# Patient Record
Sex: MALE | Race: WHITE | NOT HISPANIC OR LATINO | Employment: PART TIME | URBAN - METROPOLITAN AREA
[De-identification: names, ages, dates, MRNs, and addresses within clinical notes are randomized per-mention and may not be internally consistent; named-entity substitution may affect disease eponyms.]

---

## 2017-07-06 ENCOUNTER — HOSPITAL ENCOUNTER (EMERGENCY)
Facility: HOSPITAL | Age: 27
Discharge: HOME | End: 2017-07-06
Attending: EMERGENCY MEDICINE | Admitting: EMERGENCY MEDICINE
Payer: COMMERCIAL

## 2017-07-06 VITALS
TEMPERATURE: 97.6 F | SYSTOLIC BLOOD PRESSURE: 154 MMHG | WEIGHT: 150 LBS | DIASTOLIC BLOOD PRESSURE: 92 MMHG | RESPIRATION RATE: 16 BRPM | HEART RATE: 79 BPM | OXYGEN SATURATION: 100 %

## 2017-07-06 DIAGNOSIS — K08.89 DENTALGIA: Primary | ICD-10-CM

## 2017-07-06 PROCEDURE — 99282 EMERGENCY DEPT VISIT SF MDM: CPT

## 2017-07-06 PROCEDURE — 96372 THER/PROPH/DIAG INJ SC/IM: CPT

## 2017-07-06 RX ORDER — IBUPROFEN 400 MG/1
400 TABLET ORAL EVERY 6 HOURS PRN
Qty: 30 TABLET | Refills: 0 | Status: SHIPPED | OUTPATIENT
Start: 2017-07-06 | End: 2018-02-06

## 2017-07-06 RX ORDER — ACETAMINOPHEN 325 MG/1
975 TABLET ORAL ONCE
Status: COMPLETED | OUTPATIENT
Start: 2017-07-06 | End: 2017-07-06

## 2017-07-06 RX ORDER — KETOROLAC TROMETHAMINE 30 MG/ML
15 INJECTION, SOLUTION INTRAMUSCULAR; INTRAVENOUS ONCE
Status: COMPLETED | OUTPATIENT
Start: 2017-07-06 | End: 2017-07-06

## 2017-07-06 RX ORDER — SENNOSIDES 8.6 MG
650 CAPSULE ORAL EVERY 8 HOURS PRN
Qty: 30 TABLET | Refills: 0 | Status: SHIPPED | OUTPATIENT
Start: 2017-07-06 | End: 2018-02-06

## 2017-07-06 RX ADMIN — KETOROLAC TROMETHAMINE 15 MG: 30 INJECTION, SOLUTION INTRAMUSCULAR at 06:43

## 2017-07-06 RX ADMIN — ACETAMINOPHEN 975 MG: 325 TABLET, FILM COATED ORAL at 06:43

## 2018-02-06 ENCOUNTER — OFFICE VISIT (OUTPATIENT)
Dept: INTERNAL MEDICINE CLINIC | Facility: CLINIC | Age: 28
End: 2018-02-06
Payer: COMMERCIAL

## 2018-02-06 VITALS
SYSTOLIC BLOOD PRESSURE: 144 MMHG | HEIGHT: 67 IN | TEMPERATURE: 98.4 F | BODY MASS INDEX: 24.91 KG/M2 | DIASTOLIC BLOOD PRESSURE: 88 MMHG | WEIGHT: 158.73 LBS | HEART RATE: 78 BPM

## 2018-02-06 DIAGNOSIS — Z23 NEED FOR IMMUNIZATION AGAINST INFLUENZA: ICD-10-CM

## 2018-02-06 DIAGNOSIS — R03.0 ELEVATED BLOOD PRESSURE READING IN OFFICE WITHOUT DIAGNOSIS OF HYPERTENSION: ICD-10-CM

## 2018-02-06 DIAGNOSIS — A63.0 GENITAL WARTS: Primary | ICD-10-CM

## 2018-02-06 PROCEDURE — 99203 OFFICE O/P NEW LOW 30 MIN: CPT | Performed by: NURSE PRACTITIONER

## 2018-02-06 NOTE — PROGRESS NOTES
I have reviewed the notes, assessments, and/or procedures performed by advanced practitioner, I agree with their documentation of Xuan Galeano

## 2018-02-06 NOTE — PATIENT INSTRUCTIONS
Genital Warts   WHAT YOU NEED TO KNOW:   Genital warts are a sexually transmitted infection (STI) caused by the human papillomavirus (HPV)  Genital warts are growths that appear in or on the penis, vagina, or anus  Genital warts are spread during genital, anal, or oral sex  A woman can also pass them to a baby when she gives birth  DISCHARGE INSTRUCTIONS:   Contact your healthcare provider if:   · Your genital warts return  · The skin that is being treated for genital warts is very painful or swollen  · You see or feel new warts on another part of your body  · You have questions or concerns about your condition or care  Medicines:   · Immunomodulators  help strengthen your immune system and treat genital warts  · Antiproliferatives  help stop genital warts from growing in size or increasing in number  · Antivirals  help control and stop virus growth, such as HPV  · Take your medicine as directed  Contact your healthcare provider if you think your medicine is not helping or if you have side effects  Tell him or her if you are allergic to any medicine  Keep a list of the medicines, vitamins, and herbs you take  Include the amounts, and when and why you take them  Bring the list or the pill bottles to follow-up visits  Carry your medicine list with you in case of an emergency  Self-care:   · Do not touch or scratch the warts  This can cause the infection to spread to other parts of your body  · Do not have sex while you are being treated for genital warts  Medicine used on your skin weakens condoms and diaphragms  You also risk spreading genital warts to your partner  · Get regular Pap smears  If you are a woman, this can help diagnose HPV and prevent the spread of the virus  Prevent genital warts:   · Tell your sexual partners that you are being treated for genital warts  They may also be infected and need treatment  · Get the HPV vaccine    The HPV vaccine is given at 9 to 26 years of age to help prevent cervical cancer and genital warts  Ask your healthcare provider for more information about this vaccine  Follow up with your healthcare provider as directed:  Write down your questions so you remember to ask them during your visits  © 2017 [facility] Information is for End User's use only and may not be sold, redistributed or otherwise used for commercial purposes  All illustrations and images included in CareNotes® are the copyrighted property of [employer] M [employer]  or [address]  The above information is an  only  It is not intended as medical advice for individual conditions or treatments  Talk to your doctor, nurse or pharmacist before following any medical regimen to see if it is safe and effective for you

## 2018-02-20 ENCOUNTER — OFFICE VISIT (OUTPATIENT)
Dept: INTERNAL MEDICINE CLINIC | Facility: CLINIC | Age: 28
End: 2018-02-20
Payer: COMMERCIAL

## 2018-02-20 ENCOUNTER — APPOINTMENT (OUTPATIENT)
Dept: LAB | Facility: CLINIC | Age: 28
End: 2018-02-20
Payer: COMMERCIAL

## 2018-02-20 VITALS
SYSTOLIC BLOOD PRESSURE: 128 MMHG | WEIGHT: 160.94 LBS | HEART RATE: 72 BPM | TEMPERATURE: 97.7 F | DIASTOLIC BLOOD PRESSURE: 66 MMHG | HEIGHT: 67 IN | BODY MASS INDEX: 25.26 KG/M2

## 2018-02-20 DIAGNOSIS — R03.0 ELEVATED BLOOD PRESSURE READING IN OFFICE WITHOUT DIAGNOSIS OF HYPERTENSION: ICD-10-CM

## 2018-02-20 DIAGNOSIS — A63.0 GENITAL WARTS: Primary | ICD-10-CM

## 2018-02-20 LAB
ALBUMIN SERPL BCP-MCNC: 4.2 G/DL (ref 3.5–5)
ALP SERPL-CCNC: 68 U/L (ref 46–116)
ALT SERPL W P-5'-P-CCNC: 61 U/L (ref 12–78)
ANION GAP SERPL CALCULATED.3IONS-SCNC: 5 MMOL/L (ref 4–13)
AST SERPL W P-5'-P-CCNC: 24 U/L (ref 5–45)
BILIRUB SERPL-MCNC: 1.25 MG/DL (ref 0.2–1)
BUN SERPL-MCNC: 8 MG/DL (ref 5–25)
CALCIUM SERPL-MCNC: 8.8 MG/DL (ref 8.3–10.1)
CHLORIDE SERPL-SCNC: 101 MMOL/L (ref 100–108)
CHOLEST SERPL-MCNC: 176 MG/DL (ref 50–200)
CO2 SERPL-SCNC: 31 MMOL/L (ref 21–32)
CREAT SERPL-MCNC: 0.82 MG/DL (ref 0.6–1.3)
GFR SERPL CREATININE-BSD FRML MDRD: 120 ML/MIN/1.73SQ M
GLUCOSE P FAST SERPL-MCNC: 77 MG/DL (ref 65–99)
HDLC SERPL-MCNC: 51 MG/DL (ref 40–60)
LDLC SERPL CALC-MCNC: 95 MG/DL (ref 0–100)
POTASSIUM SERPL-SCNC: 4.3 MMOL/L (ref 3.5–5.3)
PROT SERPL-MCNC: 7.3 G/DL (ref 6.4–8.2)
SODIUM SERPL-SCNC: 137 MMOL/L (ref 136–145)
TRIGL SERPL-MCNC: 151 MG/DL
TSH SERPL DL<=0.05 MIU/L-ACNC: 2.11 UIU/ML (ref 0.36–3.74)

## 2018-02-20 PROCEDURE — 80053 COMPREHEN METABOLIC PANEL: CPT

## 2018-02-20 PROCEDURE — 3008F BODY MASS INDEX DOCD: CPT | Performed by: NURSE PRACTITIONER

## 2018-02-20 PROCEDURE — 99213 OFFICE O/P EST LOW 20 MIN: CPT | Performed by: NURSE PRACTITIONER

## 2018-02-20 PROCEDURE — 36415 COLL VENOUS BLD VENIPUNCTURE: CPT

## 2018-02-20 PROCEDURE — 84443 ASSAY THYROID STIM HORMONE: CPT

## 2018-02-20 PROCEDURE — 80061 LIPID PANEL: CPT

## 2018-02-20 PROCEDURE — 3725F SCREEN DEPRESSION PERFORMED: CPT | Performed by: NURSE PRACTITIONER

## 2018-02-20 NOTE — PROGRESS NOTES
Assessment/Plan:    Genital warts   - Keep apt with oncology team on March 6th 2018   - Get blood tests done   - Report any new s/s ASAP   - F/u with me prn; will monitor BP as needed    Subjective: Presents to the clinic for follow     Patient ID: Amos Flores is a 28 y o  male  HPI  Last seen at this clinic by me under two weeks ago for genital warts and elevated BP  He was referred to urology clinic for warts and basic labs were ordered  His urology apt is on March 6th 2018  Here today for recheck of BP and lab results  Review of his record show he did not get his blood tests done  I have reprinted his lab orders and handed it to him  I also personally walked with him to the front to show him where the lab is and to remind him to get it done  His BP is WNL, 128/66 by MA and 120/60 mmHg by me, sitting, Left arm  Denies C P , SOB, bleeding or drainage from the warts, penile pain, or urinary symptoms  The following portions of the patient's history were reviewed and updated as appropriate: allergies, current medications, past family history, past medical history, past social history, past surgical history and problem list     Review of Systems   Constitutional: Negative for appetite change, chills, fatigue and unexpected weight change  Respiratory: Negative for cough, chest tightness, shortness of breath and wheezing  Cardiovascular: Negative for chest pain and palpitations  Genitourinary: Negative for decreased urine volume, discharge, dysuria, frequency, genital sores, hematuria, penile pain, penile swelling, scrotal swelling and testicular pain  Skin: Negative for color change, pallor, rash and wound  Objective:    /66 (BP Location: Right arm, Patient Position: Sitting, Cuff Size: Adult)   Pulse 72   Temp 97 7 °F (36 5 °C) (Oral)   Ht 5' 6 5" (1 689 m)   Wt 73 kg (160 lb 15 oz)   BMI 25 59 kg/m²        Physical Exam   Constitutional: He is oriented to person, place, and time  He appears well-developed and well-nourished  No distress  HENT:   Head: Normocephalic and atraumatic  Right Ear: External ear normal    Left Ear: External ear normal    Cardiovascular: Normal rate, regular rhythm and normal heart sounds  No murmur heard  Pulmonary/Chest: Effort normal and breath sounds normal  No respiratory distress  He has no wheezes  He exhibits no tenderness  Genitourinary:   Genitourinary Comments: Examination deferred  Was done last year   Neurological: He is alert and oriented to person, place, and time  Skin: He is not diaphoretic  Psychiatric: He has a normal mood and affect   His behavior is normal  Judgment and thought content normal

## 2018-02-20 NOTE — PATIENT INSTRUCTIONS
Common Wart   WHAT YOU NEED TO KNOW:   A common wart is a thick, rough, skin growth caused by human papillomavirus virus (HPV)  HPV is a germ that spreads by skin-to-skin contact or contact with contaminated surfaces  Common warts are benign (not cancer)  DISCHARGE INSTRUCTIONS:   Contact your healthcare provider or dermatologist if:   · Your wart returns or does not go away after treatment  · Your wart grows larger, or begins to spread or cluster  · You have a wart on your face, genitals, or rectum  · Your wart bleeds, becomes painful, or drains pus  · You have questions about your condition or care  Medicines:   · Salicylic acid  helps dry and remove the wart  It is available without a prescription  Ask your healthcare provider where you can purchase it  Before you apply salicylic acid, soak the wart in warm water for 20 minutes  Keep your wart damp  Apply a small amount of salicylic acid directly to your wart  Do not apply salicylic acid to healthy skin  Cover the wart as directed  It is best to do this at bedtime  When you wake, use a pumice stone (a rough stone) or nail file to gently remove dead skin  Repeat as directed  · Take your medicine as directed  Contact your healthcare provider if you think your medicine is not helping or if you have side effects  Tell him or her if you are allergic to any medicine  Keep a list of the medicines, vitamins, and herbs you take  Include the amounts, and when and why you take them  Bring the list or the pill bottles to follow-up visits  Carry your medicine list with you in case of an emergency  Apply duct tape to your wart as directed: Your healthcare provider may tell you to apply duct tape to your wart  Duct tape helps dry and remove the wart  You may be directed to leave the duct tape on for 6 days  On day 7, take the tape off and soak the wart in warm water for 5 minutes  Gently scrape the wart with a pumice stone or nail file   Then apply a new piece of duct tape and follow the same steps until the wart is gone  Follow up with your healthcare provider as directed:  Write down your questions so you remember to ask them during your visits  © 2017 [address] Information is for End User's use only and may not be sold, redistributed or otherwise used for commercial purposes  All illustrations and images included in CareNotes® are the copyrighted property of [employer]  or Zackary Castaneda  The above information is an  only  It is not intended as medical advice for individual conditions or treatments  Talk to your doctor, nurse or pharmacist before following any medical regimen to see if it is safe and effective for you

## 2018-10-02 ENCOUNTER — OFFICE VISIT (OUTPATIENT)
Dept: MULTI SPECIALTY CLINIC | Facility: CLINIC | Age: 28
End: 2018-10-02
Payer: COMMERCIAL

## 2018-10-02 VITALS
SYSTOLIC BLOOD PRESSURE: 118 MMHG | DIASTOLIC BLOOD PRESSURE: 80 MMHG | WEIGHT: 167.99 LBS | TEMPERATURE: 98.1 F | HEART RATE: 60 BPM | BODY MASS INDEX: 26.37 KG/M2 | HEIGHT: 67 IN

## 2018-10-02 DIAGNOSIS — A63.0 GENITAL WARTS: Primary | ICD-10-CM

## 2018-10-02 PROCEDURE — 99214 OFFICE O/P EST MOD 30 MIN: CPT | Performed by: UROLOGY

## 2018-10-02 NOTE — PATIENT INSTRUCTIONS
Genital Warts   WHAT YOU NEED TO KNOW:   What are genital warts? Genital warts are a sexually transmitted infection (STI) caused by the human papillomavirus (HPV)  Genital warts are growths that appear in or on the penis, vagina, or anus  Genital warts are spread during genital, anal, or oral sex  A woman can also pass them to a baby when she gives birth  What increases my risk for genital warts? · Not wearing protection during sex, such as a condom    · Multiple sex partners    · A weak immune system caused by other STIs, HIV, or cancer    · Smoking  What are the signs and symptoms of genital warts? Genital warts are flat or dome shaped and can be pink, red, or brown  As the warts grow, your skin may itch or burn  Warts can be painful if they grow together  Over time the warts may look like cauliflower  They may feel moist and rough when you touch them  How are genital warts diagnosed? Your healthcare provider will examine you and ask about your sexual activity  He will use a light to look at your penis, vagina, or anus  You may need any of the following tests:  · An acetic acid test  is when your healthcare provider puts a solution on the affected area  The solution turns the warts white  · A sample of one or more warts  may be removed and sent for tests  The tests may show if you have a higher risk of certain types of cancer, such as cervical cancer  · A Pap smear  may show if you have HPV or other cervical problems  A sample of cells from your cervix are collected and sent for testing  How are genital warts treated? Small genital warts may heal without treatment  In some cases, the warts can get bigger, or you may get more of them  Treatment can help prevent you from spreading warts to others and may help prevent cervical cancer in women  Treatment can also take away your symptoms and help you feel better   You may need any of the following:  · Medicines:      ¨ Immunomodulators  help strengthen your immune system and treat genital warts  ¨ Antiproliferatives  help stop genital warts from growing in size or increasing in number  ¨ Antivirals  help control and stop virus growth, such as HPV  · Procedures:      ¨ Cryotherapy  is used to freeze and destroy genital warts with liquid nitrogen  ¨ Electrocautery  is used to destroy genital warts with heat  ¨ A laser  may be used to remove larger or thicker genital warts  The laser uses heat to destroy the tissues around or near your warts  This treatment may help the areas heal without leaving scars  ¨ Surgery  with a scalpel, scissors, or other surgical tools may be used to remove the warts  How can I manage my symptoms? · Do not touch or scratch the warts  This can cause the infection to spread to other parts of your body  · Do not have sex while you are being treated for genital warts  Medicine used on your skin weakens condoms and diaphragms  You also risk spreading genital warts to your partner  · Get regular Pap smears  If you are a woman, this can help diagnose HPV and prevent the spread of the virus  How can I help prevent the spread of genital warts? · Tell your sexual partners that you are being treated for genital warts  They may also be infected and need treatment  · Get the HPV vaccine  The HPV vaccine is given at 5to 32years of age to help prevent cervical cancer and genital warts  Ask your healthcare provider for more information about this vaccine  When should I contact my healthcare provider? · Your genital warts return  · The skin that is being treated for genital warts is very painful or swollen  · You see or feel new warts on another part of your body  · You have questions or concerns about your condition or care  CARE AGREEMENT:   You have the right to help plan your care  Learn about your health condition and how it may be treated   Discuss treatment options with your caregivers to decide what care you want to receive  You always have the right to refuse treatment  The above information is an  only  It is not intended as medical advice for individual conditions or treatments  Talk to your doctor, nurse or pharmacist before following any medical regimen to see if it is safe and effective for you  © 2017 [address] Information is for End User's use only and may not be sold, redistributed or otherwise used for commercial purposes  All illustrations and images included in CareNotes® are the copyrighted property of [employer]  or Zack[employer] Castaneda

## 2018-10-02 NOTE — PROGRESS NOTES
Assessment/Plan:    Genital warts  CO2 laser excision condyloma  Consent obtained by Dr Neeru Sullivan       Diagnoses and all orders for this visit:    Genital warts          Subjective:      Patient ID: Raffi Geronimo is a 33 y o  male  32 y/o male presents with genital warts x 2 years  Patient started with anal area condyloma which has gone away  Condyloma on anterior portion of penis still remains  He denies pain, itching, bleeding, penile discharge  Not currently with a partner  The following portions of the patient's history were reviewed and updated as appropriate: allergies, current medications, past family history, past medical history, past social history, past surgical history and problem list     Review of Systems   Constitutional: Negative for chills, fatigue and fever  HENT: Negative for congestion, ear pain, hearing loss, postnasal drip, sinus pressure and sore throat  Eyes: Negative for pain, redness and visual disturbance  Respiratory: Negative for cough, chest tightness, shortness of breath and wheezing  Cardiovascular: Negative for chest pain, palpitations and leg swelling  Gastrointestinal: Negative for abdominal distention, abdominal pain, constipation, diarrhea, nausea and vomiting  Genitourinary: Positive for genital sores  Negative for difficulty urinating, discharge, dysuria, frequency, hematuria, penile pain, testicular pain and urgency  Musculoskeletal: Negative for arthralgias, back pain, joint swelling and myalgias  Skin: Negative for rash and wound  Neurological: Negative for dizziness, weakness, numbness and headaches  Hematological: Negative for adenopathy  Psychiatric/Behavioral: Negative for dysphoric mood  The patient is not nervous/anxious            Objective:      /80 (BP Location: Right arm, Patient Position: Sitting, Cuff Size: Adult)   Pulse 60   Temp 98 1 °F (36 7 °C) (Oral)   Ht 5' 7" (1 702 m)   Wt 76 2 kg (167 lb 15 9 oz)   BMI 26 31 kg/m²          Physical Exam   Constitutional: He is oriented to person, place, and time  He appears well-developed and well-nourished  HENT:   Head: Normocephalic and atraumatic  Mouth/Throat: Oropharynx is clear and moist    Eyes: Pupils are equal, round, and reactive to light  EOM are normal    Neck: Neck supple  Cardiovascular: Normal rate and regular rhythm  No murmur heard  Pulmonary/Chest: Breath sounds normal  He has no wheezes  He has no rales  Abdominal: Soft  Bowel sounds are normal  He exhibits no distension  There is no tenderness  There is no rebound and no guarding  Genitourinary:   Genitourinary Comments: Several condyloma anterior penis, various sizes, non seen on scrotum or anal area   Musculoskeletal: Normal range of motion  He exhibits no edema  Lymphadenopathy:     He has no cervical adenopathy  Neurological: He is alert and oriented to person, place, and time  No cranial nerve deficit  Skin: Skin is warm  No rash noted  No erythema  Psychiatric: He has a normal mood and affect

## 2018-10-04 ENCOUNTER — TELEPHONE (OUTPATIENT)
Dept: UROLOGY | Facility: MEDICAL CENTER | Age: 28
End: 2018-10-04

## 2018-10-04 NOTE — TELEPHONE ENCOUNTER
I spoke to the patient and scheduled his procedure  Instructions mailed to the patients home address      11/2/2018 [address] w/Dr Frederik Krabbe

## 2018-10-31 ENCOUNTER — TELEPHONE (OUTPATIENT)
Dept: UROLOGY | Facility: MEDICAL CENTER | Age: 28
End: 2018-10-31

## 2018-10-31 NOTE — TELEPHONE ENCOUNTER
Phone call from PAT's to advise that when they called the patient to schedule he stated that he was not going to have the surgery 11/2/18, they advised the patient that he did need to call our office to schedule  Patient has not called  I left a detailed message for the patient today advising him to call our office if he intends to reschedule  I did advise on the voice mail that we would not be able to keep the 11/2/18 date if he changed his mind because not PAT testing has been performed as of today